# Patient Record
Sex: MALE | Race: WHITE | NOT HISPANIC OR LATINO | Employment: STUDENT | URBAN - METROPOLITAN AREA
[De-identification: names, ages, dates, MRNs, and addresses within clinical notes are randomized per-mention and may not be internally consistent; named-entity substitution may affect disease eponyms.]

---
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
## 2023-01-03 NOTE — TELEPHONE ENCOUNTER
Notification sent to this author that the [employer] message was not read by patient or parent.    Called and left voicemail on mother's line that we got a request from your [facility] in [place] for a refill on Villa's Levothyroxine.   I sent a 90 day supply with no refills.  It looks like you are seeing a new Provider Bryan McCoy Moritz at [facility]. He should be taking over Villa's care and prescribing his medications now.  Please reach out to your new Provider and let them know to take over prescriptions.    Mara Mckeon RN, BSN, CPN  Care Coordinator Pediatric Cardiology and Endocrinology  [facility]  Phone: [phone]  Fax: [phone]

## 2023-01-07 ENCOUNTER — HEALTH MAINTENANCE LETTER (OUTPATIENT)
Age: 18
End: 2023-01-07

## 2023-02-21 DIAGNOSIS — F94.0 SELECTIVE MUTISM: ICD-10-CM

## 2023-02-21 DIAGNOSIS — F41.9 ANXIETY: ICD-10-CM

## 2023-02-21 RX ORDER — FLUOXETINE 40 MG/1
CAPSULE ORAL
Qty: 30 CAPSULE | Refills: 0 | Status: SHIPPED | OUTPATIENT
Start: 2023-02-21 | End: 2023-03-27

## 2023-03-05 DIAGNOSIS — J45.30 MILD PERSISTENT ASTHMA, UNSPECIFIED WHETHER COMPLICATED: ICD-10-CM

## 2023-03-07 RX ORDER — BUDESONIDE AND FORMOTEROL FUMARATE DIHYDRATE 80; 4.5 UG/1; UG/1
AEROSOL RESPIRATORY (INHALATION)
Qty: 6.9 G | Refills: 0 | Status: SHIPPED | OUTPATIENT
Start: 2023-03-07 | End: 2023-04-06

## 2023-03-26 DIAGNOSIS — F41.9 ANXIETY: ICD-10-CM

## 2023-03-26 DIAGNOSIS — F94.0 SELECTIVE MUTISM: ICD-10-CM

## 2023-03-27 RX ORDER — FLUOXETINE 40 MG/1
CAPSULE ORAL
Qty: 30 CAPSULE | Refills: 0 | Status: SHIPPED | OUTPATIENT
Start: 2023-03-27

## 2023-03-27 NOTE — TELEPHONE ENCOUNTER
Attempt to call pt for appt reminder- VM box full, unable to LM.  Routing refill request to provider for review/approval because:  Rachel given x1 and patient did not follow up, please advise  MAGDALENE Goodman RN

## 2023-03-28 DIAGNOSIS — F94.0 SELECTIVE MUTISM: ICD-10-CM

## 2023-03-28 DIAGNOSIS — F41.9 ANXIETY: ICD-10-CM

## 2023-03-29 RX ORDER — FLUOXETINE 40 MG/1
CAPSULE ORAL
Qty: 90 CAPSULE | Refills: 1 | OUTPATIENT
Start: 2023-03-29

## 2023-03-29 NOTE — TELEPHONE ENCOUNTER
Rachel refill given x 1 patient needs office visit last seen by provider 11/4/2021.  Julie Behrendt RN

## 2023-04-04 DIAGNOSIS — J45.30 MILD PERSISTENT ASTHMA, UNSPECIFIED WHETHER COMPLICATED: ICD-10-CM

## 2023-04-05 NOTE — TELEPHONE ENCOUNTER
Attempted to reach patient, last refill was for 1 month only-needs appt. No answer, voice mailbox full.My Chart message sent.   Lynette STACK RN

## 2023-04-06 RX ORDER — BUDESONIDE AND FORMOTEROL FUMARATE DIHYDRATE 80; 4.5 UG/1; UG/1
AEROSOL RESPIRATORY (INHALATION)
Qty: 10.2 G | Refills: 0 | Status: SHIPPED | OUTPATIENT
Start: 2023-04-06 | End: 2023-08-15

## 2023-04-06 NOTE — TELEPHONE ENCOUNTER
[employer] message has not been reviewed.  Routing refill request to provider for review/approval because:  Rachel given x1 and patient did not follow up, please advise      9/21/2020     3:20 PM 9/30/2021    10:26 AM 6/3/2022     1:52 PM   ACT Total Scores   ACT TOTAL SCORE (Goal Greater than or Equal to 20) 21 18 22   In the past 12 months, how many times did you visit the emergency room for your asthma without being admitted to the hospital? 0 0 0   In the past 12 months, how many times were you hospitalized overnight because of your asthma? 0 0 0    MAGDALENE Goodman RN

## 2023-04-30 DIAGNOSIS — E03.9 HYPOTHYROIDISM, UNSPECIFIED TYPE: ICD-10-CM

## 2023-05-01 ENCOUNTER — MYC MEDICAL ADVICE (OUTPATIENT)
Dept: ENDOCRINOLOGY | Facility: CLINIC | Age: 18
End: 2023-05-01
Payer: COMMERCIAL

## 2023-05-01 ENCOUNTER — TELEPHONE (OUTPATIENT)
Dept: FAMILY MEDICINE | Facility: CLINIC | Age: 18
End: 2023-05-01
Payer: COMMERCIAL

## 2023-05-01 NOTE — TELEPHONE ENCOUNTER
Patient Quality Outreach    Patient is due for the following:   Diabetes -  Diabetic Follow-Up Visit    Next Steps:   pt tot schedule    Type of outreach:    Sent letter.      Questions for provider review:    None     Regulo Willis

## 2023-05-01 NOTE — TELEPHONE ENCOUNTER
[employer] message sent to patient and patient's mother. Patient is seeing adult Endocrinology at [facility], Dr. Bryan McCoy Moritz. Asking patient and mother if that Endocrinologist will be taking over prescribing patient's levothyroxine.    Mara Mckeon RN, BSN, CPN  Care Coordinator Pediatric Cardiology and Endocrinology  [facility]  Phone: [phone]  Fax: [phone]

## 2023-05-03 RX ORDER — LEVOTHYROXINE SODIUM 175 UG/1
175 TABLET ORAL DAILY
Qty: 90 TABLET | Refills: 0 | OUTPATIENT
Start: 2023-05-03

## 2023-05-03 NOTE — TELEPHONE ENCOUNTER
Mother's voicemail full.  Call and spoke to father. Father unsure of new doctor, but he will talk to Villa's mother and explain to have the new MD take over prescription.     Patient has not been seen with Dr. Claros since 11/9/2021.  Patient's chart shows he is seeing a new Endocrinologist Dr. Bryan McCoy Moritz.  Family was left message in December and was given a refill extension, but that they should have their new MD take over prescription.     Updated [facility] pharmacy.     Mara Mckeon RN, BSN, CPN  Care Coordinator Pediatric Cardiology and Endocrinology  [facility]  Phone: [phone]  Fax: [phone]

## 2023-05-03 NOTE — TELEPHONE ENCOUNTER
Mother's voicemail full.  Call and spoke to father. Father unsure of new doctor, but he will talk to Villa's mother and explain to have the new MD take over prescription.    Patient has not been seen with Dr. Claros since 11/9/2021.  Patient's chart shows he is seeing a new Endocrinologist Dr. Bryan McCoy Moritz.  Family was left message in December and was given a refill extension, but that they should have their new MD take over prescription.    Updated [facility] pharmacy.    Mara Mckeon RN, BSN, CPN  Care Coordinator Pediatric Cardiology and Endocrinology  [facility]  Phone: [phone]  Fax: [phone]

## 2023-05-06 DIAGNOSIS — J45.30 MILD PERSISTENT ASTHMA, UNSPECIFIED WHETHER COMPLICATED: ICD-10-CM

## 2023-05-08 NOTE — TELEPHONE ENCOUNTER
Attempt to call pt for appt reminder, VM box full- unable to LM.       9/21/2020     3:20 PM 9/30/2021    10:26 AM 6/3/2022     1:52 PM   ACT Total Scores   ACT TOTAL SCORE (Goal Greater than or Equal to 20) 21 18 22   In the past 12 months, how many times did you visit the emergency room for your asthma without being admitted to the hospital? 0 0 0   In the past 12 months, how many times were you hospitalized overnight because of your asthma? 0 0 0   [employer] ACT has previously been sent, pt has not reviewed [employer] messages.  ACT not current.  AMGDALENE Goodman RN

## 2023-05-09 RX ORDER — BUDESONIDE AND FORMOTEROL FUMARATE DIHYDRATE 80; 4.5 UG/1; UG/1
AEROSOL RESPIRATORY (INHALATION)
OUTPATIENT
Start: 2023-05-09

## 2023-06-02 ENCOUNTER — HEALTH MAINTENANCE LETTER (OUTPATIENT)
Age: 18
End: 2023-06-02

## 2023-08-14 DIAGNOSIS — J45.30 MILD PERSISTENT ASTHMA, UNSPECIFIED WHETHER COMPLICATED: ICD-10-CM

## 2023-08-15 RX ORDER — BUDESONIDE AND FORMOTEROL FUMARATE DIHYDRATE 80; 4.5 UG/1; UG/1
AEROSOL RESPIRATORY (INHALATION)
Qty: 10.2 G | Refills: 0 | Status: SHIPPED | OUTPATIENT
Start: 2023-08-15 | End: 2023-09-13

## 2023-08-15 RX ORDER — BUDESONIDE AND FORMOTEROL FUMARATE DIHYDRATE 80; 4.5 UG/1; UG/1
AEROSOL RESPIRATORY (INHALATION)
OUTPATIENT
Start: 2023-08-15

## 2023-08-15 NOTE — TELEPHONE ENCOUNTER
Has appt with Jennifer 9/20.      9/21/2020     3:20 PM 9/30/2021    10:26 AM 6/3/2022     1:52 PM   ACT Total Scores   ACT TOTAL SCORE (Goal Greater than or Equal to 20) 21 18 22   In the past 12 months, how many times did you visit the emergency room for your asthma without being admitted to the hospital? 0 0 0   In the past 12 months, how many times were you hospitalized overnight because of your asthma? 0 0 0      Rachel refill given.  MAGDALENE Goodman RN

## 2023-08-19 DIAGNOSIS — J45.30 MILD PERSISTENT ASTHMA, UNSPECIFIED WHETHER COMPLICATED: ICD-10-CM

## 2023-08-21 DIAGNOSIS — J45.30 MILD PERSISTENT ASTHMA, UNSPECIFIED WHETHER COMPLICATED: ICD-10-CM

## 2023-08-21 RX ORDER — ALBUTEROL SULFATE 90 UG/1
AEROSOL, METERED RESPIRATORY (INHALATION)
Qty: 18 G | Refills: 0 | Status: SHIPPED | OUTPATIENT
Start: 2023-08-21 | End: 2023-08-22

## 2023-08-22 RX ORDER — ALBUTEROL SULFATE 90 UG/1
2 AEROSOL, METERED RESPIRATORY (INHALATION) EVERY 6 HOURS PRN
Qty: 18 G | Refills: 0 | Status: SHIPPED | OUTPATIENT
Start: 2023-08-22

## 2023-09-03 DIAGNOSIS — E03.9 HYPOTHYROIDISM, UNSPECIFIED TYPE: ICD-10-CM

## 2023-09-05 ENCOUNTER — MYC MEDICAL ADVICE (OUTPATIENT)
Dept: ENDOCRINOLOGY | Facility: CLINIC | Age: 18
End: 2023-09-05
Payer: COMMERCIAL

## 2023-09-05 RX ORDER — LEVOTHYROXINE SODIUM 175 UG/1
175 TABLET ORAL DAILY
Qty: 90 TABLET | Refills: 0 | OUTPATIENT
Start: 2023-09-05

## 2023-09-08 NOTE — TELEPHONE ENCOUNTER
Called and mother answered. Explained that automatic refill came from [facility] for Dr. Claros to refill, but since he is seeing Adult Endothey should have adult endo refill  Villa's levothyroxine.  Mother understood and was going to update pharmacy and have Adult Provider prescribe.    Mara Mckeon RN, BSN, CPN  Care Coordinator Pediatric Cardiology and Endocrinology  [facility]  Phone: [phone]  Fax: [phone]

## 2023-09-13 DIAGNOSIS — J45.30 MILD PERSISTENT ASTHMA, UNSPECIFIED WHETHER COMPLICATED: ICD-10-CM

## 2023-09-13 RX ORDER — BUDESONIDE AND FORMOTEROL FUMARATE DIHYDRATE 80; 4.5 UG/1; UG/1
AEROSOL RESPIRATORY (INHALATION)
Qty: 10.2 G | Refills: 0 | Status: SHIPPED | OUTPATIENT
Start: 2023-09-13 | End: 2023-12-18

## 2023-09-13 NOTE — TELEPHONE ENCOUNTER
"Has appointment scheduled for 9/20/23      Requested Prescriptions   Pending Prescriptions Disp Refills    budesonide-formoterol (SYMBICORT) 80-4.5 MCG/ACT Inhaler [Pharmacy Med Name: SYMBICORT 80-4.5 MCG INHALER]       Sig: TAKE 2 PUFFS BY MOUTH TWICE A DAY       Inhaled Steroids Protocol Failed - 9/13/2023 12:47 AM        Failed - Asthma control assessment score within normal limits in last 6 months     Please review ACT score.           Passed - Patient is age 12 or older        Passed - Medication is active on med list        Passed - Recent (6 mo) or future (30 days) visit within the authorizing provider's specialty     Patient had office visit in the last 6 months or has a visit in the next 30 days with authorizing provider or within the authorizing provider's specialty.  See \"Patient Info\" tab in inbasket, or \"Choose Columns\" in Meds & Orders section of the refill encounter.           Long-Acting Beta Agonist Inhalers Protocol  Failed - 9/13/2023 12:47 AM        Failed - Asthma control assessment score within normal limits in last 6 months     Please review ACT score.           Passed - Patient is age 12 or older        Passed - Order for Serevent, Striverdi, or Foradil and pt has steroid inhaler        Passed - Medication is active on med list        Passed - Recent (6 mo) or future (30 days) visit within the authorizing provider's specialty     Patient had office visit in the last 6 months or has a visit in the next 30 days with authorizing provider or within the authorizing provider's specialty.  See \"Patient Info\" tab in inbasket, or \"Choose Columns\" in Meds & Orders section of the refill encounter.                 "

## 2023-09-24 DIAGNOSIS — J45.30 MILD PERSISTENT ASTHMA, UNSPECIFIED WHETHER COMPLICATED: ICD-10-CM

## 2023-09-26 RX ORDER — ALBUTEROL SULFATE 90 UG/1
2 AEROSOL, METERED RESPIRATORY (INHALATION) EVERY 6 HOURS PRN
OUTPATIENT
Start: 2023-09-26

## 2023-12-15 DIAGNOSIS — J45.30 MILD PERSISTENT ASTHMA, UNSPECIFIED WHETHER COMPLICATED: ICD-10-CM

## 2023-12-18 RX ORDER — BUDESONIDE AND FORMOTEROL FUMARATE DIHYDRATE 80; 4.5 UG/1; UG/1
AEROSOL RESPIRATORY (INHALATION)
Qty: 10.2 G | Refills: 0 | Status: SHIPPED | OUTPATIENT
Start: 2023-12-18 | End: 2024-01-15

## 2023-12-18 NOTE — TELEPHONE ENCOUNTER
"Requested Prescriptions   Pending Prescriptions Disp Refills    budesonide-formoterol (SYMBICORT) 80-4.5 MCG/ACT Inhaler [Pharmacy Med Name: SYMBICORT 80-4.5 MCG INHALER]       Sig: TAKE 2 PUFFS BY MOUTH TWICE A DAY       Inhaled Steroids Protocol Failed - 12/15/2023  8:35 PM        Failed - Asthma control assessment score within normal limits in last 6 months     Please review ACT score.           Failed - Recent (6 mo) or future (30 days) visit within the authorizing provider's specialty     Patient had office visit in the last 6 months or has a visit in the next 30 days with authorizing provider or within the authorizing provider's specialty.  See \"Patient Info\" tab in inbasket, or \"Choose Columns\" in Meds & Orders section of the refill encounter.            Passed - Patient is age 12 or older        Passed - Medication is active on med list       Long-Acting Beta Agonist Inhalers Protocol  Failed - 12/15/2023  8:35 PM        Failed - Asthma control assessment score within normal limits in last 6 months     Please review ACT score.           Failed - Recent (6 mo) or future (30 days) visit within the authorizing provider's specialty     Patient had office visit in the last 6 months or has a visit in the next 30 days with authorizing provider or within the authorizing provider's specialty.  See \"Patient Info\" tab in inbasket, or \"Choose Columns\" in Meds & Orders section of the refill encounter.            Passed - Patient is age 12 or older        Passed - Order for Serevent, Striverdi, or Foradil and pt has steroid inhaler        Passed - Medication is active on med list             "

## 2024-01-15 DIAGNOSIS — J45.30 MILD PERSISTENT ASTHMA, UNSPECIFIED WHETHER COMPLICATED: ICD-10-CM

## 2024-01-15 RX ORDER — BUDESONIDE AND FORMOTEROL FUMARATE DIHYDRATE 80; 4.5 UG/1; UG/1
AEROSOL RESPIRATORY (INHALATION)
Qty: 10 G | Refills: 0 | Status: SHIPPED | OUTPATIENT
Start: 2024-01-15 | End: 2024-01-17

## 2024-01-17 RX ORDER — BUDESONIDE AND FORMOTEROL FUMARATE DIHYDRATE 80; 4.5 UG/1; UG/1
AEROSOL RESPIRATORY (INHALATION)
Qty: 30 G | Refills: 0 | Status: SHIPPED | OUTPATIENT
Start: 2024-01-17 | End: 2024-01-19

## 2024-01-18 ENCOUNTER — TELEPHONE (OUTPATIENT)
Dept: FAMILY MEDICINE | Facility: CLINIC | Age: 19
End: 2024-01-18

## 2024-01-18 DIAGNOSIS — J45.30 MILD PERSISTENT ASTHMA, UNSPECIFIED WHETHER COMPLICATED: ICD-10-CM

## 2024-01-18 NOTE — TELEPHONE ENCOUNTER
Retail Pharmacy Prior Authorization Team   Phone: [phone]        PRIOR AUTHORIZATION DENIED    Medication: BUDESONIDE-FORMOTEROL FUMARATE 80-4.5 MCG/ACT IN AERO  Insurance Company: [facility] [employer]ty [employer]'s - Phone [phone] Fax [phone]  Denial Date: 1/17/2024  Denial Reason(s):           Appeal Information:         Patient Notified: No. Please note: Providers/Clinics are to notify the patients of the denial outcomes and steps going forward.

## 2024-01-19 RX ORDER — FLUTICASONE FUROATE AND VILANTEROL 100; 25 UG/1; UG/1
1 POWDER RESPIRATORY (INHALATION) DAILY
Qty: 28 EACH | Refills: 0 | Status: CANCELLED | OUTPATIENT
Start: 2024-01-19

## 2024-01-19 NOTE — TELEPHONE ENCOUNTER
Hilton is extremely overdue to see me.  I last saw him in late 2021.  He needs to either see me OR continue to establish care with new provider.  He saw Johnathan in Oct. I do not want him without an inhaler so I am sending 1 inhaler.  But I will NOT refill further without seeing him.  Prescription pended, he needs to understand above message before you send prescription.  His discretion if he schedules or not, but no refills.

## 2024-02-10 ENCOUNTER — HEALTH MAINTENANCE LETTER (OUTPATIENT)
Age: 19
End: 2024-02-10

## 2024-06-11 NOTE — TELEPHONE ENCOUNTER
Patient Education       Well Child Exam 9 Months   About this topic   Your baby's 9-month well child exam is a visit with the doctor to check your baby's health. The doctor measures your baby's weight, height, and head size. The doctor plots these numbers on a growth curve. The growth curve gives a picture of your baby's growth at each visit. The doctor may listen to your baby's heart, lungs, and belly. Your doctor will do a full exam of your baby from the head to the toes.  Your baby may also need shots or blood tests during this visit.  General   Growth and Development   Your doctor will ask you how your baby is developing. The doctor will focus on the skills that most children your baby's age are expected to do. During this time of your baby's life, here are some things you can expect.  Movement - Your baby may:  Begin to crawl without help  Start to pull up and stand  Start to wave  Sit without support  Use finger and thumb to  small objects  Move objects smoothy between hands  Start putting objects in their mouth  Hearing, seeing, and talking - Your baby will likely:  Respond to name  Say things like Mama or Jim, but not specific to the parent  Enjoy playing peek-a-mosquera  Will use fingers to point at things  Copy your sounds and gestures  Begin to understand no. Try to distract or redirect to correct your baby.  Be more comfortable with familiar people and toys. Be prepared for tears when saying good bye. Say I love you and then leave. Your baby may be upset, but will calm down in a little bit.  Feeding - Your baby:  Still takes breast milk or formula for some nutrition. Always hold your baby when feeding. Do not prop a bottle. Propping the bottle makes it easier for your baby to choke and get ear infections.  Is likely ready to start drinking water from a cup. Limit water to no more than 8 ounces per day. Healthy babies do not need extra water. Breastmilk and formula provide all of the fluids they  Patient last saw Dr. Claros on 11/9/2021. From chart review it looks like patient is seeing Dr. Bryan McCoy Moritz at [facility]. I have reached out to parents previously regarding having their new MD take over prescribing.  Will send [employer] message to have Dr. McCoy Moritz take over Levothyroxine.    Mara Mckeon RN, BSN, CPN  Care Coordinator Pediatric Cardiology and Endocrinology  [facility]  Phone: [phone]  Fax: [phone]    need.  Will be eating cereal and other baby foods for 3 meals and 2 to 3 snacks a day  May be ready to start eating table foods that are soft, mashed, or pureed.  Dont force your baby to eat foods. You may have to offer a food more than 10 times before your baby will like it.  Give your baby very small bites of soft finger foods like bananas or well cooked vegetables.  Watch for signs your baby is full, like turning the head or leaning back.  Avoid foods that can cause choking, such as whole grapes, popcorn, nuts or hot dogs.  Should be allowed to try to eat without help. Mealtime will be messy.  Should not have fruit juice.  May have new teeth. If so, brush them 2 times each day with a smear of toothpaste. Use a cold clean wash cloth or teething ring to help ease sore gums.  Sleep - Your baby:  Should still sleep in a safe crib, on the back, alone for naps and at night. Keep soft bedding, bumpers, and toys out of your baby's bed. It is OK if your baby rolls over without help at night.  Is likely sleeping about 9 to 10 hours in a row at night  Needs 1 to 2 naps each day  Sleeps about a total of 14 hours each day  Should be able to fall asleep without help. If your baby wakes up at night, check on your baby. Do not pick your baby up, offer a bottle, or play with your baby. Doing these things will not help your baby fall asleep without help.  Should not have a bottle in bed. This can cause tooth decay or ear infections. Give a bottle before putting your baby in the crib for the night.  Shots or vaccines - It is important for your baby to get shots on time. This protects from very serious illnesses like lung infections, meningitis, or infections that damage their nervous system. Your baby may need to get shots if it is flu season or if they were missed earlier. Check with your doctor to make sure your baby's shots are up to date. This is one of the most important things you can do to keep your baby healthy.  Help for  Parents   Play with your baby.  Give your baby soft balls, blocks, and containers to play with. Toys that make noise are also good.  Read to your baby. Name the things in the pictures in the book. Talk and sing to your baby. Use real language, not baby talk. This helps your baby learn language skills.  Sing songs with hand motions like pat-a-cake or active nursery rhymes.  Hide a toy partly under a blanket for your baby to find.  Here are some things you can do to help keep your baby safe and healthy.  Do not allow anyone to smoke in your home or around your baby. Second hand smoke can harm your baby.  Have the right size car seat for your baby and use it every time your baby is in the car. Your baby should be rear facing until at least 2 years of age or older.  Pad corners and sharp edges. Put a gate at the top and bottom of the stairs. Be sure furniture, shelves, and televisions are secure and cannot tip onto your baby.  Take extra care if your baby is in the kitchen.  Make sure you use the back burners on the stove and turn pot handles so your baby cannot grab them.  Keep hot items like liquids, coffee pots, and heaters away from your baby.  Put childproof locks on cabinets, especially those that contain cleaning supplies or other things that may harm your baby.  Never leave your baby alone. Do not leave your baby in the car, in the bath, or at home alone, even for a few minutes.  Avoid screen time for children under 2 years old. This means no TV, computers, or video games. They can cause problems with brain development.  Parents need to think about:  Coping with mealtime messes  How to distract your baby when doing something you dont want your baby to do  Using positive words to tell your baby what you want, rather than saying no or what not to do  How to childproof your home and yard to keep from having to say no to your baby as much  Your next well child visit will most likely be when your baby is 12 months  old. At this visit your doctor may:  Do a full check up on your baby  Talk about making sure your home is safe for your baby, if your baby becomes upset when you leave, and how to correct your baby  Give your baby the next set of shots     When do I need to call the doctor?   Fever of 100.4°F (38°C) or higher  Sleeps all the time or has trouble sleeping  Won't stop crying  You are worried about your baby's development  Where can I learn more?   American Academy of Pediatrics  https://www.healthychildren.org/English/ages-stages/baby/feeding-nutrition/Pages/Switching-To-Solid-Foods.aspx   Centers for Disease Control and Prevention  https://www.cdc.gov/ncbddd/actearly/milestones/milestones-9mo.html   Kids Health  https://kidshealth.org/en/parents/checkup-9mos.html?ref=search   Last Reviewed Date   2021-09-17  Consumer Information Use and Disclaimer   This information is not specific medical advice and does not replace information you receive from your health care provider. This is only a brief summary of general information. It does NOT include all information about conditions, illnesses, injuries, tests, procedures, treatments, therapies, discharge instructions or life-style choices that may apply to you. You must talk with your health care provider for complete information about your health and treatment options. This information should not be used to decide whether or not to accept your health care providers advice, instructions or recommendations. Only your health care provider has the knowledge and training to provide advice that is right for you.  Copyright   Copyright © 2021 [employer], [employer]. and its affiliates and/or licensors. All rights reserved.    Children under the age of 2 years will be restrained in a rear facing child safety seat.   If you have an active [facility] account, please look for your well child questionnaire to come to your [facility] account before your next well child visit.

## 2024-09-07 ENCOUNTER — HEALTH MAINTENANCE LETTER (OUTPATIENT)
Age: 19
End: 2024-09-07

## 2024-09-08 NOTE — LETTER
May 1, 2023    To  [address]  [address] [facility]   [place] [address]    Your team at [facility] cares about your health. We have reviewed your chart and based on our findings; we are making the following recommendations to better manage your health.     You are in particular need of attention regarding the following:     Schedule a DIABETIC FOLLOW UP appointment for Office Visit. Patients with diabetes should see their provider regularly.    If you have already completed these items, please contact the clinic via phone or   [facility] so your care team can review and update your records. Thank you for   choosing [facility] for your healthcare needs. For any questions,   concerns, or to schedule an appointment please contact our clinic.    Healthy Regards,      Your [facility] Care Team                    No

## 2025-03-08 ENCOUNTER — HEALTH MAINTENANCE LETTER (OUTPATIENT)
Age: 20
End: 2025-03-08